# Patient Record
Sex: MALE | Race: WHITE | Employment: OTHER | URBAN - METROPOLITAN AREA
[De-identification: names, ages, dates, MRNs, and addresses within clinical notes are randomized per-mention and may not be internally consistent; named-entity substitution may affect disease eponyms.]

---

## 2017-02-07 PROBLEM — F41.9 ANXIETY: Status: ACTIVE | Noted: 2017-02-07

## 2017-05-12 ENCOUNTER — TELEPHONE (OUTPATIENT)
Dept: FAMILY MEDICINE CLINIC | Facility: CLINIC | Age: 42
End: 2017-05-12

## 2017-05-12 RX ORDER — CLONAZEPAM 0.5 MG/1
0.5 TABLET ORAL 2 TIMES DAILY PRN
Qty: 40 TABLET | Refills: 0 | Status: SHIPPED | OUTPATIENT
Start: 2017-05-12 | End: 2017-08-04

## 2017-05-12 NOTE — TELEPHONE ENCOUNTER
Prescription for ClonazePAM 0.5 M has been called into [facility] in [facility] .

## 2017-09-01 PROCEDURE — 84403 ASSAY OF TOTAL TESTOSTERONE: CPT | Performed by: FAMILY MEDICINE

## 2017-09-01 PROCEDURE — 36415 COLL VENOUS BLD VENIPUNCTURE: CPT | Performed by: FAMILY MEDICINE

## 2017-10-31 ENCOUNTER — TELEPHONE (OUTPATIENT)
Dept: FAMILY MEDICINE CLINIC | Facility: CLINIC | Age: 42
End: 2017-10-31

## 2017-10-31 NOTE — TELEPHONE ENCOUNTER
Needs appt. Spoke to patient and he states he was seeing another provider. Would like to return to Dr. Krishna Cota. Patient asked for refill because \"nothing has changed\". Advised he still needs to schedule appt for further refills from our clinic.   He w

## 2017-10-31 NOTE — TELEPHONE ENCOUNTER
Pt called in requesting refill on medication below. Pt requesting more than one refill if possible. Current Outpatient Prescriptions:     •  ClonazePAM 0.5 MG Oral Tab, Take 1 tablet (0.5 mg total) by mouth 2 (two) times daily as needed for Anxiety. ,

## 2019-02-09 PROCEDURE — 88305 TISSUE EXAM BY PATHOLOGIST: CPT | Performed by: INTERNAL MEDICINE

## 2019-04-10 ENCOUNTER — LAB ENCOUNTER (OUTPATIENT)
Dept: LAB | Age: 44
End: 2019-04-10
Attending: FAMILY MEDICINE
Payer: COMMERCIAL

## 2019-04-10 DIAGNOSIS — R68.82 LIBIDO, DECREASED: ICD-10-CM

## 2019-04-10 DIAGNOSIS — F41.9 ANXIETY: ICD-10-CM

## 2019-04-10 PROCEDURE — 84443 ASSAY THYROID STIM HORMONE: CPT

## 2019-04-10 PROCEDURE — 80053 COMPREHEN METABOLIC PANEL: CPT

## 2019-04-10 PROCEDURE — 84403 ASSAY OF TOTAL TESTOSTERONE: CPT

## 2019-04-10 PROCEDURE — 84402 ASSAY OF FREE TESTOSTERONE: CPT

## 2019-04-10 PROCEDURE — 36415 COLL VENOUS BLD VENIPUNCTURE: CPT

## 2019-04-10 PROCEDURE — 85025 COMPLETE CBC W/AUTO DIFF WBC: CPT

## 2019-04-10 NOTE — PROGRESS NOTES
HPI:    Patient ID: Dickson Sosa is a 45year old male. Patient here due to anxiety problems . Also complains about some dizziness fatigue, has occasional palpitations, low libido.     now, going through court as wife is trying have their child exhibits no tenderness. Abdominal: Soft. Bowel sounds are normal. He exhibits no distension. Periumbilical tenderness   Musculoskeletal: He exhibits no edema, tenderness or deformity.               ASSESSMENT/PLAN:   Anxiety  (primary encounter diagnosi

## 2019-06-26 RX ORDER — PROPRANOLOL HYDROCHLORIDE 10 MG/1
TABLET ORAL
Qty: 90 TABLET | Refills: 1 | Status: SHIPPED | OUTPATIENT
Start: 2019-06-26 | End: 2019-08-16

## 2019-06-26 NOTE — TELEPHONE ENCOUNTER
Refill passed per [facility] protocol.   Hypertensive Medications  Protocol Criteria:  · Appointment scheduled in the past 6 months or in the next 3 months  · BMP or CMP in the past 12 months  · Creatinine result < 2  Recent Outpatient Visits

## 2019-08-16 ENCOUNTER — OFFICE VISIT (OUTPATIENT)
Dept: FAMILY MEDICINE CLINIC | Facility: CLINIC | Age: 44
End: 2019-08-16
Payer: COMMERCIAL

## 2019-08-16 ENCOUNTER — TELEPHONE (OUTPATIENT)
Dept: FAMILY MEDICINE CLINIC | Facility: CLINIC | Age: 44
End: 2019-08-16

## 2019-08-16 ENCOUNTER — LAB ENCOUNTER (OUTPATIENT)
Dept: LAB | Age: 44
End: 2019-08-16
Attending: FAMILY MEDICINE
Payer: COMMERCIAL

## 2019-08-16 DIAGNOSIS — Z00.00 ANNUAL PHYSICAL EXAM: ICD-10-CM

## 2019-08-16 DIAGNOSIS — Z00.00 ANNUAL PHYSICAL EXAM: Primary | ICD-10-CM

## 2019-08-16 LAB
25(OH)D3 SERPL-MCNC: 54.7 NG/ML (ref 30–100)
BASOPHILS # BLD AUTO: 0.04 X10(3) UL (ref 0–0.2)
BASOPHILS NFR BLD AUTO: 1.2 %
CHOLEST SMN-MCNC: 159 MG/DL (ref ?–200)
CRP SERPL HS-MCNC: 0.58 MG/L (ref ?–3)
DEPRECATED RDW RBC AUTO: 41.3 FL (ref 35.1–46.3)
EOSINOPHIL # BLD AUTO: 0.06 X10(3) UL (ref 0–0.7)
EOSINOPHIL NFR BLD AUTO: 1.8 %
ERYTHROCYTE [DISTWIDTH] IN BLOOD BY AUTOMATED COUNT: 12.6 % (ref 11–15)
HCT VFR BLD AUTO: 42.8 % (ref 39–53)
HDLC SERPL-MCNC: 40 MG/DL (ref 40–59)
HGB BLD-MCNC: 14.7 G/DL (ref 13–17.5)
IMM GRANULOCYTES # BLD AUTO: 0.01 X10(3) UL (ref 0–1)
IMM GRANULOCYTES NFR BLD: 0.3 %
IRON SATURATION: 25 % (ref 20–50)
IRON SERPL-MCNC: 93 UG/DL (ref 65–175)
LDLC SERPL CALC-MCNC: 94 MG/DL (ref ?–100)
LYMPHOCYTES # BLD AUTO: 1.07 X10(3) UL (ref 1–4)
LYMPHOCYTES NFR BLD AUTO: 32.4 %
MCH RBC QN AUTO: 30.8 PG (ref 26–34)
MCHC RBC AUTO-ENTMCNC: 34.3 G/DL (ref 31–37)
MCV RBC AUTO: 89.7 FL (ref 80–100)
MONOCYTES # BLD AUTO: 0.29 X10(3) UL (ref 0.1–1)
MONOCYTES NFR BLD AUTO: 8.8 %
NEUTROPHILS # BLD AUTO: 1.83 X10 (3) UL (ref 1.5–7.7)
NEUTROPHILS # BLD AUTO: 1.83 X10(3) UL (ref 1.5–7.7)
NEUTROPHILS NFR BLD AUTO: 55.5 %
NONHDLC SERPL-MCNC: 119 MG/DL (ref ?–130)
PATIENT FASTING: YES
PLATELET # BLD AUTO: 191 10(3)UL (ref 150–450)
RBC # BLD AUTO: 4.77 X10(6)UL (ref 4.3–5.7)
TOTAL IRON BINDING CAPACITY: 370 UG/DL (ref 240–450)
TRANSFERRIN SERPL-MCNC: 248 MG/DL (ref 200–360)
TRIGL SERPL-MCNC: 124 MG/DL (ref 30–149)
VIT B12 SERPL-MCNC: 801 PG/ML (ref 193–986)
VLDLC SERPL CALC-MCNC: 25 MG/DL (ref 0–30)
WBC # BLD AUTO: 3.3 X10(3) UL (ref 4–11)

## 2019-08-16 PROCEDURE — 80061 LIPID PANEL: CPT

## 2019-08-16 PROCEDURE — 86141 C-REACTIVE PROTEIN HS: CPT

## 2019-08-16 PROCEDURE — 85025 COMPLETE CBC W/AUTO DIFF WBC: CPT

## 2019-08-16 PROCEDURE — 82306 VITAMIN D 25 HYDROXY: CPT

## 2019-08-16 PROCEDURE — 84466 ASSAY OF TRANSFERRIN: CPT

## 2019-08-16 PROCEDURE — 82607 VITAMIN B-12: CPT

## 2019-08-16 PROCEDURE — 36415 COLL VENOUS BLD VENIPUNCTURE: CPT

## 2019-08-16 PROCEDURE — 99396 PREV VISIT EST AGE 40-64: CPT | Performed by: FAMILY MEDICINE

## 2019-08-16 PROCEDURE — 83540 ASSAY OF IRON: CPT

## 2019-08-16 RX ORDER — ESCITALOPRAM OXALATE 10 MG/1
TABLET ORAL
Qty: 90 TABLET | Refills: 0 | Status: SHIPPED | OUTPATIENT
Start: 2019-08-16 | End: 2019-10-21

## 2019-08-16 RX ORDER — METHOCARBAMOL 500 MG/1
500 TABLET, FILM COATED ORAL 4 TIMES DAILY
Qty: 40 TABLET | Refills: 0 | Status: SHIPPED | OUTPATIENT
Start: 2019-08-16 | End: 2020-01-14

## 2019-08-16 RX ORDER — BUPROPION HYDROCHLORIDE 150 MG/1
TABLET ORAL
Qty: 30 TABLET | Refills: 0 | Status: SHIPPED | OUTPATIENT
Start: 2019-08-16 | End: 2019-08-16

## 2019-08-16 RX ORDER — ESZOPICLONE 2 MG/1
2 TABLET, FILM COATED ORAL NIGHTLY
Qty: 90 TABLET | Refills: 3 | Status: SHIPPED | OUTPATIENT
Start: 2019-08-16 | End: 2019-10-18

## 2019-08-16 RX ORDER — CLONAZEPAM 0.5 MG/1
0.5 TABLET ORAL 2 TIMES DAILY PRN
Qty: 90 TABLET | Refills: 0 | Status: SHIPPED | OUTPATIENT
Start: 2019-08-16 | End: 2020-01-14

## 2019-08-16 NOTE — TELEPHONE ENCOUNTER
Pt called in he stated out of the 4 medications he was given only 2 was sent to Pharmacy  The medications listed below he stated was not at the pharmacy      Current Outpatient Medications:  clonazePAM 0.5 MG Oral Tab Take 1 tablet (0.5 mg total) by mouth

## 2019-08-16 NOTE — PROGRESS NOTES
HPI:    Patient ID: Alma Kasper is a 45year old male. Patient is still quite anxious and feels very tired      Review of Systems   Constitutional: Positive for fatigue. Negative for activity change, appetite change and unexpected weight change.    Respi ambien  F/u in 6 weeks  Orders Placed This Encounter      Vitamin D, 25-Hydroxy [E]      Vitamin B12 [E]      Iron And Tibc [E]      Lipid Panel [E]      CBC W Differential W Platelet [E]      C-RP/High Sensitivity [E]      Meds This Visit:  Requested Pres

## 2019-08-28 ENCOUNTER — TELEPHONE (OUTPATIENT)
Dept: FAMILY MEDICINE CLINIC | Facility: CLINIC | Age: 44
End: 2019-08-28

## 2019-08-28 RX ORDER — ESZOPICLONE 1 MG/1
1 TABLET, FILM COATED ORAL NIGHTLY
Qty: 90 TABLET | Refills: 1 | Status: SHIPPED | OUTPATIENT
Start: 2019-08-28 | End: 2020-01-14

## 2019-08-28 NOTE — TELEPHONE ENCOUNTER
Please clarify Rx prior to calling pharmacy--your message states #30 of 1 mg, but order entered is for 1 mg  #90 + 1    Please reply to pool: EM RN Beau Ricardo, not in routing comments please  Medication Question     Jose Juan Montejo MD  Em Rn Triage 50 minutes a

## 2019-08-28 NOTE — TELEPHONE ENCOUNTER
Per pt, he is taking Eszopiclone (LUNESTA) 2mg now BUT would like to know if TSB can reduce it to 1mg. Pls advise and send new rx med to his pharmacy on file verified.

## 2019-09-05 ENCOUNTER — LAB ENCOUNTER (OUTPATIENT)
Dept: LAB | Facility: HOSPITAL | Age: 44
End: 2019-09-05
Attending: INTERNAL MEDICINE
Payer: COMMERCIAL

## 2019-09-05 ENCOUNTER — OFFICE VISIT (OUTPATIENT)
Dept: HEMATOLOGY/ONCOLOGY | Facility: HOSPITAL | Age: 44
End: 2019-09-05
Attending: INTERNAL MEDICINE
Payer: COMMERCIAL

## 2019-09-05 VITALS
HEART RATE: 85 BPM | HEIGHT: 70 IN | DIASTOLIC BLOOD PRESSURE: 67 MMHG | WEIGHT: 173 LBS | RESPIRATION RATE: 16 BRPM | BODY MASS INDEX: 24.77 KG/M2 | SYSTOLIC BLOOD PRESSURE: 123 MMHG | TEMPERATURE: 98 F

## 2019-09-05 DIAGNOSIS — D70.8 OTHER NEUTROPENIA (HCC): ICD-10-CM

## 2019-09-05 DIAGNOSIS — D70.8 OTHER NEUTROPENIA (HCC): Primary | ICD-10-CM

## 2019-09-05 DIAGNOSIS — F41.9 ANXIETY: ICD-10-CM

## 2019-09-05 LAB
ALBUMIN SERPL-MCNC: 4 G/DL (ref 3.4–5)
ALBUMIN/GLOB SERPL: 1.1 {RATIO} (ref 1–2)
ALP LIVER SERPL-CCNC: 75 U/L (ref 45–117)
ALT SERPL-CCNC: 30 U/L (ref 16–61)
ANION GAP SERPL CALC-SCNC: 6 MMOL/L (ref 0–18)
AST SERPL-CCNC: 14 U/L (ref 15–37)
BASOPHILS # BLD AUTO: 0.04 X10(3) UL (ref 0–0.2)
BASOPHILS NFR BLD AUTO: 0.9 %
BILIRUB SERPL-MCNC: 0.7 MG/DL (ref 0.1–2)
BUN BLD-MCNC: 11 MG/DL (ref 7–18)
BUN/CREAT SERPL: 12.1 (ref 10–20)
C3 SERPL-MCNC: 125 MG/DL (ref 90–180)
C4 SERPL-MCNC: 25.5 MG/DL (ref 10–40)
CALCIUM BLD-MCNC: 9.3 MG/DL (ref 8.5–10.1)
CHLORIDE SERPL-SCNC: 107 MMOL/L (ref 98–112)
CO2 SERPL-SCNC: 29 MMOL/L (ref 21–32)
CREAT BLD-MCNC: 0.91 MG/DL (ref 0.7–1.3)
DEPRECATED HBV CORE AB SER IA-ACNC: 34.1 NG/ML (ref 30–490)
DEPRECATED RDW RBC AUTO: 43.4 FL (ref 35.1–46.3)
EOSINOPHIL # BLD AUTO: 0.11 X10(3) UL (ref 0–0.7)
EOSINOPHIL NFR BLD AUTO: 2.6 %
ERYTHROCYTE [DISTWIDTH] IN BLOOD BY AUTOMATED COUNT: 13 % (ref 11–15)
ERYTHROCYTE [SEDIMENTATION RATE] IN BLOOD: 8 MM/HR (ref 0–15)
FOLATE SERPL-MCNC: >20 NG/ML (ref 8.7–?)
GLOBULIN PLAS-MCNC: 3.6 G/DL (ref 2.8–4.4)
GLUCOSE BLD-MCNC: 98 MG/DL (ref 70–99)
HCT VFR BLD AUTO: 42.3 % (ref 39–53)
HGB BLD-MCNC: 14.7 G/DL (ref 13–17.5)
HGB RETIC QN AUTO: 35.5 PG (ref 28.2–36.6)
IGA SERPL-MCNC: 277 MG/DL (ref 70–312)
IGM SERPL-MCNC: 108 MG/DL (ref 43–279)
IMM GRANULOCYTES # BLD AUTO: 0.01 X10(3) UL (ref 0–1)
IMM GRANULOCYTES NFR BLD: 0.2 %
IMM RETICS NFR: 0.08 RATIO (ref 0.1–0.3)
IMMUNOGLOBULIN PNL SER-MCNC: 1020 MG/DL (ref 791–1643)
LDH SERPL L TO P-CCNC: 148 U/L (ref 87–241)
LYMPHOCYTES # BLD AUTO: 1.01 X10(3) UL (ref 1–4)
LYMPHOCYTES NFR BLD AUTO: 23.8 %
M PROTEIN MFR SERPL ELPH: 7.6 G/DL (ref 6.4–8.2)
MCH RBC QN AUTO: 31.7 PG (ref 26–34)
MCHC RBC AUTO-ENTMCNC: 34.8 G/DL (ref 31–37)
MCV RBC AUTO: 91.2 FL (ref 80–100)
MONOCYTES # BLD AUTO: 0.35 X10(3) UL (ref 0.1–1)
MONOCYTES NFR BLD AUTO: 8.2 %
NEUTROPHILS # BLD AUTO: 2.73 X10 (3) UL (ref 1.5–7.7)
NEUTROPHILS # BLD AUTO: 2.73 X10(3) UL (ref 1.5–7.7)
NEUTROPHILS NFR BLD AUTO: 64.3 %
OSMOLALITY SERPL CALC.SUM OF ELEC: 293 MOSM/KG (ref 275–295)
PATIENT FASTING: NO
PLATELET # BLD AUTO: 172 10(3)UL (ref 150–450)
POTASSIUM SERPL-SCNC: 4.4 MMOL/L (ref 3.5–5.1)
RBC # BLD AUTO: 4.64 X10(6)UL (ref 4.3–5.7)
RETICS # AUTO: 73.3 X10(3) UL (ref 22.5–147.5)
RETICS/RBC NFR AUTO: 1.6 % (ref 0.5–2.5)
SODIUM SERPL-SCNC: 142 MMOL/L (ref 136–145)
TSI SER-ACNC: 0.86 MIU/ML (ref 0.36–3.74)
VIT B12 SERPL-MCNC: 682 PG/ML (ref 193–986)
WBC # BLD AUTO: 4.3 X10(3) UL (ref 4–11)

## 2019-09-05 PROCEDURE — 86160 COMPLEMENT ANTIGEN: CPT

## 2019-09-05 PROCEDURE — 87340 HEPATITIS B SURFACE AG IA: CPT

## 2019-09-05 PROCEDURE — 85652 RBC SED RATE AUTOMATED: CPT

## 2019-09-05 PROCEDURE — 80053 COMPREHEN METABOLIC PANEL: CPT

## 2019-09-05 PROCEDURE — 83615 LACTATE (LD) (LDH) ENZYME: CPT

## 2019-09-05 PROCEDURE — 80500 HEPATITIS A B + C PROFILE: CPT

## 2019-09-05 PROCEDURE — 86706 HEP B SURFACE ANTIBODY: CPT

## 2019-09-05 PROCEDURE — 84443 ASSAY THYROID STIM HORMONE: CPT

## 2019-09-05 PROCEDURE — 87389 HIV-1 AG W/HIV-1&-2 AB AG IA: CPT

## 2019-09-05 PROCEDURE — 85025 COMPLETE CBC W/AUTO DIFF WBC: CPT

## 2019-09-05 PROCEDURE — 85045 AUTOMATED RETICULOCYTE COUNT: CPT

## 2019-09-05 PROCEDURE — 86708 HEPATITIS A ANTIBODY: CPT

## 2019-09-05 PROCEDURE — 36415 COLL VENOUS BLD VENIPUNCTURE: CPT

## 2019-09-05 PROCEDURE — 86704 HEP B CORE ANTIBODY TOTAL: CPT

## 2019-09-05 PROCEDURE — 82746 ASSAY OF FOLIC ACID SERUM: CPT

## 2019-09-05 PROCEDURE — 86803 HEPATITIS C AB TEST: CPT

## 2019-09-05 PROCEDURE — 82607 VITAMIN B-12: CPT

## 2019-09-05 PROCEDURE — 82728 ASSAY OF FERRITIN: CPT

## 2019-09-05 PROCEDURE — 99244 OFF/OP CNSLTJ NEW/EST MOD 40: CPT | Performed by: INTERNAL MEDICINE

## 2019-09-05 PROCEDURE — 86038 ANTINUCLEAR ANTIBODIES: CPT

## 2019-09-05 PROCEDURE — 82784 ASSAY IGA/IGD/IGG/IGM EACH: CPT

## 2019-09-05 NOTE — CONSULTS
[facility] History and Physical    Patient Name: Nila Damian   YOB: 1975   Medical Record Number: [id number]   CSN: [id number]   Attending Physician:  Hailee Garvey MD     Date of Visit: 9/5/2019     Chief Complaint:  Patient presents with: Smoking: none  ETOH: drinking pattern with father's death; none; last ETOH was 2 years, did have relapse  Work: computer programming;     Current Medications:    Current Outpatient Medications:   •  Eszopiclone (LUNESTA) 1 MG Oral Tab, Take 1 tablet (1 m Lab Results   Component Value Date    WBC 4.3 09/05/2019    RBC 4.64 09/05/2019    HGB 14.7 09/05/2019    HCT 42.3 09/05/2019    MCV 91.2 09/05/2019    MCH 31.7 09/05/2019    MCHC 34.8 09/05/2019    RDW 13.0 09/05/2019    .0 09/05/2019    MPV 7.6

## 2019-09-06 PROBLEM — D70.8 OTHER NEUTROPENIA (HCC): Status: ACTIVE | Noted: 2019-09-06

## 2019-09-06 LAB
HAV AB SER QL IA: NONREACTIVE
HBV CORE AB SERPL QL IA: NONREACTIVE
HBV SURFACE AB SER QL: NONREACTIVE
HBV SURFACE AB SERPL IA-ACNC: 3.61 MIU/ML
HBV SURFACE AG SERPL QL IA: NONREACTIVE
HCV AB SERPL QL IA: NONREACTIVE

## 2019-09-09 LAB — NUCLEAR IGG TITR SER IF: NEGATIVE {TITER}

## 2019-09-19 ENCOUNTER — OFFICE VISIT (OUTPATIENT)
Dept: HEMATOLOGY/ONCOLOGY | Facility: HOSPITAL | Age: 44
End: 2019-09-19
Attending: INTERNAL MEDICINE
Payer: COMMERCIAL

## 2019-09-19 VITALS
OXYGEN SATURATION: 99 % | RESPIRATION RATE: 16 BRPM | BODY MASS INDEX: 24.91 KG/M2 | SYSTOLIC BLOOD PRESSURE: 117 MMHG | DIASTOLIC BLOOD PRESSURE: 70 MMHG | HEIGHT: 70 IN | TEMPERATURE: 98 F | WEIGHT: 174 LBS | HEART RATE: 74 BPM

## 2019-09-19 DIAGNOSIS — F41.9 ANXIETY: ICD-10-CM

## 2019-09-19 DIAGNOSIS — D70.8 OTHER NEUTROPENIA (HCC): Primary | ICD-10-CM

## 2019-09-19 PROCEDURE — 99213 OFFICE O/P EST LOW 20 MIN: CPT | Performed by: INTERNAL MEDICINE

## 2019-09-19 NOTE — PROGRESS NOTES
Cancer Center Progress Note    Patient Name: Felisha Castrejon   YOB: 1975   Medical Record Number: [id number]   Attending Physician: Cornell Guillory M.D.      Chief Complaint:  nuetropenia    Oncology History:  Felisha Castrejon is a 45year old male with p (0.5 mg total) by mouth 2 (two) times daily as needed for Anxiety. , Disp: 90 tablet, Rfl: 0  •  Eszopiclone (LUNESTA) 2 MG Oral Tab, Take 1 tablet (2 mg total) by mouth nightly., Disp: 90 tablet, Rfl: 3  •  escitalopram (LEXAPRO) 10 MG Oral Tab, 1/2 po qd

## 2019-09-23 PROCEDURE — 88305 TISSUE EXAM BY PATHOLOGIST: CPT | Performed by: INTERNAL MEDICINE

## 2019-10-18 NOTE — PROGRESS NOTES
HPI:    Patient ID: Jose J Montague is a 45year old male. Here for f/u anxiety. .. patient doing better. Needs refill on his medication . Also cbc normal.                       Review of Systems   Constitutional: Positive for fatigue.  Negative for activit Visit:  Requested Prescriptions     Signed Prescriptions Disp Refills   • diazepam 2 MG Oral Tab 1 tablet 1     Sig: diazepam 2 mg tablet       Imaging & Referrals:  None       DD#[id number]

## 2019-10-23 RX ORDER — ESCITALOPRAM OXALATE 10 MG/1
TABLET ORAL
Qty: 90 TABLET | Refills: 1 | Status: SHIPPED | OUTPATIENT
Start: 2019-10-23 | End: 2021-10-01

## 2019-10-24 RX ORDER — DIAZEPAM 2 MG/1
TABLET ORAL
Qty: 40 TABLET | Refills: 0 | OUTPATIENT
Start: 2019-10-24 | End: 2020-01-14

## 2019-10-24 NOTE — TELEPHONE ENCOUNTER
Controlled medication pending for review. If approved needs to be called in or faxed by on-site staff. Last Rx: 10/18/19 #1#1   LOV: 10/18/19    Refill passed per [facility] protocol.   Refill Protocol Appointment Criteria  · Appointment scheduled

## 2019-10-25 RX ORDER — DIAZEPAM 2 MG/1
TABLET ORAL
Qty: 40 TABLET | Refills: 0 | OUTPATIENT
Start: 2019-10-25

## 2019-12-16 ENCOUNTER — OFFICE VISIT (OUTPATIENT)
Dept: HEMATOLOGY/ONCOLOGY | Facility: HOSPITAL | Age: 44
End: 2019-12-16
Attending: INTERNAL MEDICINE
Payer: COMMERCIAL

## 2019-12-16 ENCOUNTER — LAB ENCOUNTER (OUTPATIENT)
Dept: LAB | Facility: HOSPITAL | Age: 44
End: 2019-12-16
Attending: INTERNAL MEDICINE
Payer: COMMERCIAL

## 2019-12-16 ENCOUNTER — TELEPHONE (OUTPATIENT)
Dept: HEMATOLOGY/ONCOLOGY | Facility: HOSPITAL | Age: 44
End: 2019-12-16

## 2019-12-16 VITALS
WEIGHT: 177 LBS | SYSTOLIC BLOOD PRESSURE: 119 MMHG | TEMPERATURE: 98 F | RESPIRATION RATE: 16 BRPM | DIASTOLIC BLOOD PRESSURE: 69 MMHG | OXYGEN SATURATION: 100 % | HEIGHT: 70 IN | HEART RATE: 67 BPM | BODY MASS INDEX: 25.34 KG/M2

## 2019-12-16 DIAGNOSIS — D70.8 OTHER NEUTROPENIA (HCC): ICD-10-CM

## 2019-12-16 DIAGNOSIS — F41.9 ANXIETY: ICD-10-CM

## 2019-12-16 DIAGNOSIS — D70.8 OTHER NEUTROPENIA (HCC): Primary | ICD-10-CM

## 2019-12-16 PROCEDURE — 99213 OFFICE O/P EST LOW 20 MIN: CPT | Performed by: INTERNAL MEDICINE

## 2019-12-16 PROCEDURE — 36415 COLL VENOUS BLD VENIPUNCTURE: CPT

## 2019-12-16 PROCEDURE — 80053 COMPREHEN METABOLIC PANEL: CPT

## 2019-12-16 PROCEDURE — 85025 COMPLETE CBC W/AUTO DIFF WBC: CPT

## 2019-12-16 NOTE — PROGRESS NOTES
Cancer Center Progress Note    Patient Name: Skye Chappell   YOB: 1975   Medical Record Number: [id number]   Attending Physician: Dale Gee M.D.      Chief Complaint:  nuetropenia    Oncology History:  See consult note    Interim HPI:  Feels No edema, cyanosis, or bruising  Neurological: motor strength grossly intact  Psych: appropriate mood and affect    Skin: no lesion    Laboratory assessed and reviewed:  Lab Results   Component Value Date    GLU 98 09/05/2019    BUN 11 09/05/2019    BUNCRE

## 2019-12-16 NOTE — TELEPHONE ENCOUNTER
Called [facility] to discuss lab results per Dr Zhane Jasso. Explained lab results and asked pt to please follow with his PCP in 8-12 weeks with repeat labs.   It was at this time that the patient stated that he saw Dr Tonia Cogan note on mychart and stated that it was [facility]

## 2020-01-15 RX ORDER — ESCITALOPRAM OXALATE 10 MG/1
TABLET ORAL
Qty: 90 TABLET | Refills: 1 | OUTPATIENT
Start: 2020-01-15

## 2020-01-16 NOTE — TELEPHONE ENCOUNTER
Review pended refill request as it does not fall under a protocol. Last Rx: 8/2019  LOV: 2 months ago    Duplicate request, previously addressed.

## 2020-01-17 RX ORDER — METHOCARBAMOL 500 MG/1
500 TABLET, FILM COATED ORAL 4 TIMES DAILY
Qty: 40 TABLET | Refills: 0 | Status: SHIPPED | OUTPATIENT
Start: 2020-01-17

## 2020-01-17 RX ORDER — ESZOPICLONE 1 MG/1
1 TABLET, FILM COATED ORAL NIGHTLY
Qty: 90 TABLET | Refills: 1 | Status: SHIPPED | OUTPATIENT
Start: 2020-01-17

## 2020-01-17 RX ORDER — CLONAZEPAM 0.5 MG/1
0.5 TABLET ORAL 2 TIMES DAILY PRN
Qty: 90 TABLET | Refills: 0 | Status: SHIPPED | OUTPATIENT
Start: 2020-01-17 | End: 2021-10-01

## 2020-01-17 RX ORDER — DIAZEPAM 2 MG/1
TABLET ORAL
Qty: 40 TABLET | Refills: 0 | Status: SHIPPED | OUTPATIENT
Start: 2020-01-17 | End: 2021-10-01

## 2020-04-17 RX ORDER — ESCITALOPRAM OXALATE 10 MG/1
TABLET ORAL
Qty: 90 TABLET | Refills: 1 | OUTPATIENT
Start: 2020-04-17

## 2020-09-25 ENCOUNTER — HOSPITAL (OUTPATIENT)
Dept: OTHER | Age: 45
End: 2020-09-25
Attending: PSYCHIATRY & NEUROLOGY

## 2020-10-12 ENCOUNTER — HOSPITAL (OUTPATIENT)
Dept: OTHER | Age: 45
End: 2020-10-12
Attending: PAIN MEDICINE

## 2021-08-31 ENCOUNTER — WALK IN (OUTPATIENT)
Dept: URGENT CARE | Age: 46
End: 2021-08-31

## 2021-08-31 VITALS — HEART RATE: 90 BPM | RESPIRATION RATE: 18 BRPM | TEMPERATURE: 98.5 F | OXYGEN SATURATION: 98 %

## 2021-08-31 DIAGNOSIS — R09.81 SINUS CONGESTION: Primary | ICD-10-CM

## 2021-08-31 LAB — SARS-COV+SARS-COV-2 AG RESP QL IA.RAPID: NOT DETECTED

## 2021-08-31 PROCEDURE — 99203 OFFICE O/P NEW LOW 30 MIN: CPT | Performed by: FAMILY MEDICINE

## 2021-08-31 PROCEDURE — 87426 SARSCOV CORONAVIRUS AG IA: CPT | Performed by: FAMILY MEDICINE

## 2021-08-31 RX ORDER — CYCLOBENZAPRINE HCL 10 MG
10 TABLET ORAL
COMMUNITY
Start: 2021-07-02

## 2021-08-31 RX ORDER — ESZOPICLONE 1 MG/1
1 TABLET, FILM COATED ORAL
COMMUNITY
Start: 2020-01-17

## 2021-08-31 RX ORDER — DIAZEPAM 2 MG/1
1 TABLET ORAL EVERY 8 HOURS PRN
COMMUNITY
Start: 2020-01-17

## 2021-08-31 RX ORDER — ESCITALOPRAM OXALATE 10 MG/1
1 TABLET ORAL DAILY
COMMUNITY
Start: 2019-10-23

## 2021-08-31 RX ORDER — TRAMADOL HYDROCHLORIDE 50 MG/1
TABLET ORAL
COMMUNITY
Start: 2021-07-02

## 2021-08-31 ASSESSMENT — PAIN SCALES - GENERAL: PAINLEVEL: 0

## 2021-10-01 ENCOUNTER — LAB ENCOUNTER (OUTPATIENT)
Dept: LAB | Age: 46
End: 2021-10-01
Attending: FAMILY MEDICINE
Payer: COMMERCIAL

## 2021-10-01 ENCOUNTER — OFFICE VISIT (OUTPATIENT)
Dept: FAMILY MEDICINE CLINIC | Facility: CLINIC | Age: 46
End: 2021-10-01
Payer: COMMERCIAL

## 2021-10-01 VITALS
HEIGHT: 70 IN | BODY MASS INDEX: 25.77 KG/M2 | DIASTOLIC BLOOD PRESSURE: 66 MMHG | WEIGHT: 180 LBS | HEART RATE: 76 BPM | SYSTOLIC BLOOD PRESSURE: 100 MMHG

## 2021-10-01 DIAGNOSIS — R10.9 ABDOMINAL PAIN, UNSPECIFIED ABDOMINAL LOCATION: ICD-10-CM

## 2021-10-01 DIAGNOSIS — Z00.00 ANNUAL PHYSICAL EXAM: Primary | ICD-10-CM

## 2021-10-01 DIAGNOSIS — Z00.00 ANNUAL PHYSICAL EXAM: ICD-10-CM

## 2021-10-01 PROCEDURE — 86038 ANTINUCLEAR ANTIBODIES: CPT

## 2021-10-01 PROCEDURE — 80053 COMPREHEN METABOLIC PANEL: CPT

## 2021-10-01 PROCEDURE — 3078F DIAST BP <80 MM HG: CPT | Performed by: FAMILY MEDICINE

## 2021-10-01 PROCEDURE — 3008F BODY MASS INDEX DOCD: CPT | Performed by: FAMILY MEDICINE

## 2021-10-01 PROCEDURE — 85025 COMPLETE CBC W/AUTO DIFF WBC: CPT

## 2021-10-01 PROCEDURE — 3074F SYST BP LT 130 MM HG: CPT | Performed by: FAMILY MEDICINE

## 2021-10-01 PROCEDURE — 36415 COLL VENOUS BLD VENIPUNCTURE: CPT

## 2021-10-01 PROCEDURE — 80061 LIPID PANEL: CPT

## 2021-10-01 PROCEDURE — 99396 PREV VISIT EST AGE 40-64: CPT | Performed by: FAMILY MEDICINE

## 2021-10-01 PROCEDURE — 86140 C-REACTIVE PROTEIN: CPT

## 2021-10-01 PROCEDURE — 82306 VITAMIN D 25 HYDROXY: CPT

## 2021-10-01 RX ORDER — ESZOPICLONE 1 MG/1
1 TABLET, FILM COATED ORAL NIGHTLY
Qty: 30 TABLET | Refills: 0 | Status: SHIPPED | OUTPATIENT
Start: 2021-10-01

## 2021-10-01 RX ORDER — METHOCARBAMOL 500 MG/1
500 TABLET, FILM COATED ORAL 4 TIMES DAILY
Qty: 70 TABLET | Refills: 0 | Status: SHIPPED | OUTPATIENT
Start: 2021-10-01

## 2021-10-01 RX ORDER — CYCLOBENZAPRINE HCL 10 MG
10 TABLET ORAL NIGHTLY PRN
COMMUNITY
Start: 2021-07-02 | End: 2021-10-01

## 2021-10-01 NOTE — PROGRESS NOTES
Subjective:   Patient ID: Damion Schreiber is a 42year old male. Here for annual physical   Doing well. Has occasional muscle stiffness and asking for refill of robaxine . Also sometimes trouble with sleep.        History/Other:   Review of Systems   Const Referrals:  None

## 2022-06-06 RX ORDER — ESZOPICLONE 1 MG/1
1 TABLET, FILM COATED ORAL NIGHTLY
Qty: 30 TABLET | Refills: 0 | Status: SHIPPED | OUTPATIENT
Start: 2022-06-06 | End: 2022-10-18

## 2022-06-06 RX ORDER — METHOCARBAMOL 500 MG/1
500 TABLET, FILM COATED ORAL 4 TIMES DAILY
Qty: 30 TABLET | Refills: 0 | Status: SHIPPED | OUTPATIENT
Start: 2022-06-06

## 2022-06-06 NOTE — TELEPHONE ENCOUNTER
Please review Protocol Failed/No Protocol        Requested Prescriptions   Pending Prescriptions Disp Refills    eszopiclone (LUNESTA) 1 MG Oral Tab 90 tablet 1     Sig: Take 1 tablet (1 mg total) by mouth nightly. There is no refill protocol information for this order        methocarbamol (ROBAXIN) 500 MG Oral Tab 70 tablet 0     Sig: Take 1 tablet (500 mg total) by mouth 4 (four) times daily.         There is no refill protocol information for this order               Recent Outpatient Visits              8 months ago Annual physical exam    Pippa Chong MD    Office Visit    2 years ago Other neutropenia [facility])    [facility] Hematology Oncology Arin Guillory MD    Office Visit    2 years ago Darrius Villanueva MD    Office Visit    2 years ago Other neutropenia [facility])    [facility] Hematology Oncology Arin Guillory MD    Office Visit    2 years ago Other neutropenia [facility])    [facility]ematology Oncology Arin Guillory MD    Office Visit

## 2022-06-07 NOTE — TELEPHONE ENCOUNTER
Call center please call and schedule an appointment. Thank You. [employer] message sent to the patient.         Authorized by: Jayjay Sosa MD     Non-formulary    To pharmacy: Needs to make an apt for further refills

## 2022-10-09 ENCOUNTER — IMAGING SERVICES (OUTPATIENT)
Dept: GENERAL RADIOLOGY | Age: 47
End: 2022-10-09
Attending: FAMILY MEDICINE

## 2022-10-09 ENCOUNTER — WALK IN (OUTPATIENT)
Dept: URGENT CARE | Age: 47
End: 2022-10-09

## 2022-10-09 VITALS
HEART RATE: 79 BPM | DIASTOLIC BLOOD PRESSURE: 82 MMHG | SYSTOLIC BLOOD PRESSURE: 120 MMHG | TEMPERATURE: 97.9 F | RESPIRATION RATE: 18 BRPM

## 2022-10-09 DIAGNOSIS — R10.31 RIGHT LOWER QUADRANT ABDOMINAL PAIN: Primary | ICD-10-CM

## 2022-10-09 DIAGNOSIS — R10.31 RIGHT LOWER QUADRANT ABDOMINAL PAIN: ICD-10-CM

## 2022-10-09 LAB
APPEARANCE, POC: CLEAR
BILIRUBIN, POC: NEGATIVE
COLOR, POC: YELLOW
GLUCOSE UR-MCNC: NEGATIVE MG/DL
KETONES, POC: NEGATIVE MG/DL
NITRITE, POC: NEGATIVE
OCCULT BLOOD, POC: ABNORMAL
PH UR: 5.5 [PH] (ref 5–7)
PROT UR-MCNC: NEGATIVE MG/DL
SP GR UR: <= 1.005 (ref 1–1.03)
UROBILINOGEN UR-MCNC: 0.2 MG/DL (ref 0–1)
WBC (LEUKOCYTE) ESTERASE, POC: NEGATIVE

## 2022-10-09 PROCEDURE — 74018 RADEX ABDOMEN 1 VIEW: CPT | Performed by: RADIOLOGY

## 2022-10-09 PROCEDURE — PSEU9005 URINE, BACTERIAL CULTURE: Performed by: CLINICAL MEDICAL LABORATORY

## 2022-10-09 PROCEDURE — 87086 URINE CULTURE/COLONY COUNT: CPT | Performed by: FAMILY MEDICINE

## 2022-10-09 PROCEDURE — 99214 OFFICE O/P EST MOD 30 MIN: CPT | Performed by: FAMILY MEDICINE

## 2022-10-09 PROCEDURE — 81015 MICROSCOPIC EXAM OF URINE: CPT | Performed by: FAMILY MEDICINE

## 2022-10-09 PROCEDURE — 81002 URINALYSIS NONAUTO W/O SCOPE: CPT | Performed by: FAMILY MEDICINE

## 2022-10-09 PROCEDURE — 87086 URINE CULTURE/COLONY COUNT: CPT | Performed by: CLINICAL MEDICAL LABORATORY

## 2022-10-09 ASSESSMENT — PAIN SCALES - GENERAL: PAINLEVEL: 0

## 2022-10-10 ENCOUNTER — LAB REQUISITION (OUTPATIENT)
Dept: LAB | Age: 47
End: 2022-10-10

## 2022-10-10 DIAGNOSIS — R10.31 RIGHT LOWER QUADRANT PAIN: ICD-10-CM

## 2022-10-10 LAB
MUCOUS: NORMAL
RED BLOOD CELLS URINE: 0 (ref 0–3)
SQUAMOUS EPITHELIAL CELLS: 0
WHITE BLOOD CELLS URINE: 0 (ref 0–5)

## 2022-10-11 ENCOUNTER — HOSPITAL ENCOUNTER (OUTPATIENT)
Dept: CT IMAGING | Age: 47
Discharge: HOME | End: 2022-10-11
Attending: FAMILY MEDICINE

## 2022-10-11 DIAGNOSIS — Z13.6 SCREENING FOR CARDIOVASCULAR CONDITION: ICD-10-CM

## 2022-10-11 NOTE — PROGRESS NOTES
Date of Service 10/11/2022    Ev Watson  Date of Birth 1/17/1975    Patient Age: 41year old    PCP: Caleen Frankel, MD  [address] [place] [id number]    Consult Type  Type Scan/Screening: Heart Scan  Preliminary Heart Scan Score: 0                Body Mass Index  There is no height or weight on file to calculate BMI. Lipid Profile  Cholesterol: 242, done on 10/1/2021. HDL Cholesterol: 47, done on 10/1/2021. LDL Cholesterol: 180, done on 10/1/2021. TriGlycerides 88, done on 10/1/2021. Lower LDL - goal is less than 100. Nurse Review  Risk factor information and results reviewed with Nurse: Yes    Recommended Follow Up:  Consult your physician regarding[de-identified] Final Heart Scan Report; Discuss potential for Incidental Finding    No data recorded      Recommendations for Change:     Cholesterol Modification (goal of therapy depends upon your risk): Decrease LDL (Lousy/Bad) Ideal <100     Smoking Cessation: No Change Needed  Weight Management: Maintain Current Weight  Stress Management: Adopt Stress Management Techniques  Repeat Heart Scan: 5 years if Calcium Score is 0. 0; Discuss with your Physician          Noni Recommended Resources: Ayesha Nichole RN        Please Contact the Nurse Heart Line with any Questions or Concerns [phone].

## 2022-10-12 LAB
BACTERIA UR CULT: NORMAL
BACTERIA UR CULT: NORMAL

## 2022-10-14 ENCOUNTER — OFFICE VISIT (OUTPATIENT)
Dept: FAMILY MEDICINE CLINIC | Facility: CLINIC | Age: 47
End: 2022-10-14
Payer: COMMERCIAL

## 2022-10-14 ENCOUNTER — LAB ENCOUNTER (OUTPATIENT)
Dept: LAB | Age: 47
End: 2022-10-14
Attending: FAMILY MEDICINE
Payer: COMMERCIAL

## 2022-10-14 VITALS
SYSTOLIC BLOOD PRESSURE: 121 MMHG | BODY MASS INDEX: 25.77 KG/M2 | DIASTOLIC BLOOD PRESSURE: 73 MMHG | HEART RATE: 71 BPM | WEIGHT: 180 LBS | HEIGHT: 70 IN

## 2022-10-14 DIAGNOSIS — R10.9 ABDOMINAL PAIN, UNSPECIFIED ABDOMINAL LOCATION: Primary | ICD-10-CM

## 2022-10-14 DIAGNOSIS — Z00.00 ANNUAL PHYSICAL EXAM: ICD-10-CM

## 2022-10-14 DIAGNOSIS — Z80.0 FAMILY HISTORY OF COLON CANCER: ICD-10-CM

## 2022-10-14 LAB
ALBUMIN SERPL-MCNC: 4.2 G/DL (ref 3.4–5)
ALBUMIN/GLOB SERPL: 1.1 {RATIO} (ref 1–2)
ALP LIVER SERPL-CCNC: 64 U/L
ALT SERPL-CCNC: 23 U/L
ANION GAP SERPL CALC-SCNC: 8 MMOL/L (ref 0–18)
AST SERPL-CCNC: 17 U/L (ref 15–37)
BILIRUB SERPL-MCNC: 0.8 MG/DL (ref 0.1–2)
BUN BLD-MCNC: 15 MG/DL (ref 7–18)
BUN/CREAT SERPL: 11.3 (ref 10–20)
CALCIUM BLD-MCNC: 9.4 MG/DL (ref 8.5–10.1)
CHLORIDE SERPL-SCNC: 104 MMOL/L (ref 98–112)
CHOLEST SERPL-MCNC: 233 MG/DL (ref ?–200)
CO2 SERPL-SCNC: 25 MMOL/L (ref 21–32)
COMPLEXED PSA SERPL-MCNC: 0.78 NG/ML (ref ?–4)
CREAT BLD-MCNC: 1.33 MG/DL
FASTING PATIENT LIPID ANSWER: YES
FASTING STATUS PATIENT QL REPORTED: YES
GFR SERPLBLD BASED ON 1.73 SQ M-ARVRAT: 67 ML/MIN/1.73M2 (ref 60–?)
GLOBULIN PLAS-MCNC: 3.7 G/DL (ref 2.8–4.4)
GLUCOSE BLD-MCNC: 104 MG/DL (ref 70–99)
HDLC SERPL-MCNC: 47 MG/DL (ref 40–59)
LDLC SERPL CALC-MCNC: 164 MG/DL (ref ?–100)
NONHDLC SERPL-MCNC: 186 MG/DL (ref ?–130)
OSMOLALITY SERPL CALC.SUM OF ELEC: 285 MOSM/KG (ref 275–295)
POTASSIUM SERPL-SCNC: 4 MMOL/L (ref 3.5–5.1)
PROT SERPL-MCNC: 7.9 G/DL (ref 6.4–8.2)
SODIUM SERPL-SCNC: 137 MMOL/L (ref 136–145)
TRIGL SERPL-MCNC: 121 MG/DL (ref 30–149)
TSI SER-ACNC: 2.41 MIU/ML (ref 0.36–3.74)
VLDLC SERPL CALC-MCNC: 24 MG/DL (ref 0–30)

## 2022-10-14 PROCEDURE — 3008F BODY MASS INDEX DOCD: CPT | Performed by: FAMILY MEDICINE

## 2022-10-14 PROCEDURE — 36415 COLL VENOUS BLD VENIPUNCTURE: CPT

## 2022-10-14 PROCEDURE — 99396 PREV VISIT EST AGE 40-64: CPT | Performed by: FAMILY MEDICINE

## 2022-10-14 PROCEDURE — 84443 ASSAY THYROID STIM HORMONE: CPT

## 2022-10-14 PROCEDURE — 80053 COMPREHEN METABOLIC PANEL: CPT

## 2022-10-14 PROCEDURE — 84403 ASSAY OF TOTAL TESTOSTERONE: CPT

## 2022-10-14 PROCEDURE — 3078F DIAST BP <80 MM HG: CPT | Performed by: FAMILY MEDICINE

## 2022-10-14 PROCEDURE — 3074F SYST BP LT 130 MM HG: CPT | Performed by: FAMILY MEDICINE

## 2022-10-14 PROCEDURE — 85025 COMPLETE CBC W/AUTO DIFF WBC: CPT

## 2022-10-14 PROCEDURE — 80061 LIPID PANEL: CPT

## 2022-10-14 PROCEDURE — 84402 ASSAY OF FREE TESTOSTERONE: CPT

## 2022-10-15 ENCOUNTER — LAB ENCOUNTER (OUTPATIENT)
Dept: LAB | Facility: HOSPITAL | Age: 47
End: 2022-10-15
Attending: FAMILY MEDICINE
Payer: COMMERCIAL

## 2022-10-15 DIAGNOSIS — Z00.00 ANNUAL PHYSICAL EXAM: ICD-10-CM

## 2022-10-15 LAB
BASOPHILS # BLD AUTO: 0.04 X10(3) UL (ref 0–0.2)
BASOPHILS NFR BLD AUTO: 1 %
EOSINOPHIL # BLD AUTO: 0.12 X10(3) UL (ref 0–0.7)
EOSINOPHIL NFR BLD AUTO: 3 %
ERYTHROCYTE [DISTWIDTH] IN BLOOD BY AUTOMATED COUNT: 12.4 %
HCT VFR BLD AUTO: 45 %
HGB BLD-MCNC: 15.3 G/DL
IMM GRANULOCYTES # BLD AUTO: 0 X10(3) UL (ref 0–1)
IMM GRANULOCYTES NFR BLD: 0 %
LYMPHOCYTES # BLD AUTO: 1.23 X10(3) UL (ref 1–4)
LYMPHOCYTES NFR BLD AUTO: 30.7 %
MCH RBC QN AUTO: 30.4 PG (ref 26–34)
MCHC RBC AUTO-ENTMCNC: 34 G/DL (ref 31–37)
MCV RBC AUTO: 89.5 FL
MONOCYTES # BLD AUTO: 0.32 X10(3) UL (ref 0.1–1)
MONOCYTES NFR BLD AUTO: 8 %
NEUTROPHILS # BLD AUTO: 2.3 X10 (3) UL (ref 1.5–7.7)
NEUTROPHILS # BLD AUTO: 2.3 X10(3) UL (ref 1.5–7.7)
NEUTROPHILS NFR BLD AUTO: 57.3 %
PLATELET # BLD AUTO: 165 10(3)UL (ref 150–450)
RBC # BLD AUTO: 5.03 X10(6)UL
WBC # BLD AUTO: 4 X10(3) UL (ref 4–11)

## 2022-10-15 PROCEDURE — 36415 COLL VENOUS BLD VENIPUNCTURE: CPT

## 2022-10-15 PROCEDURE — 85025 COMPLETE CBC W/AUTO DIFF WBC: CPT

## 2022-10-17 ENCOUNTER — PATIENT MESSAGE (OUTPATIENT)
Dept: FAMILY MEDICINE CLINIC | Facility: CLINIC | Age: 47
End: 2022-10-17

## 2022-10-18 RX ORDER — ESZOPICLONE 1 MG/1
1 TABLET, FILM COATED ORAL NIGHTLY
Qty: 30 TABLET | Refills: 1 | Status: SHIPPED | OUTPATIENT
Start: 2022-10-18

## 2022-10-18 NOTE — TELEPHONE ENCOUNTER
From: Serg Figueroa  To: Savannah Real MD  Sent: 10/17/2022 9:03 PM CDT  Subject: Follow Up    Thank you for your message on labs and diet. It doesn't look like testosterone was completed? Also, I haven't received a message from [facility] about refill on Lunesta, if you can please process when convenient. Jasonala.

## 2022-10-27 LAB
TESTOSTERONE, FREE, S: 9.5 NG/DL
TESTOSTERONE, TOTAL, S: 392 NG/DL

## 2023-07-13 ENCOUNTER — PATIENT MESSAGE (OUTPATIENT)
Dept: URGENT CARE | Age: 48
End: 2023-07-13

## 2023-07-13 DIAGNOSIS — R94.31 ABNORMAL EKG: Primary | ICD-10-CM

## 2023-07-14 ENCOUNTER — OFFICE VISIT (OUTPATIENT)
Dept: FAMILY MEDICINE CLINIC | Facility: CLINIC | Age: 48
End: 2023-07-14

## 2023-07-14 VITALS
BODY MASS INDEX: 25.74 KG/M2 | DIASTOLIC BLOOD PRESSURE: 70 MMHG | HEIGHT: 70 IN | WEIGHT: 179.81 LBS | RESPIRATION RATE: 18 BRPM | SYSTOLIC BLOOD PRESSURE: 110 MMHG | OXYGEN SATURATION: 98 % | HEART RATE: 74 BPM

## 2023-07-14 DIAGNOSIS — R07.9 CHEST PAIN, UNSPECIFIED TYPE: Primary | ICD-10-CM

## 2023-07-14 PROCEDURE — 99215 OFFICE O/P EST HI 40 MIN: CPT | Performed by: FAMILY MEDICINE

## 2023-07-14 PROCEDURE — 3078F DIAST BP <80 MM HG: CPT | Performed by: FAMILY MEDICINE

## 2023-07-14 PROCEDURE — 3074F SYST BP LT 130 MM HG: CPT | Performed by: FAMILY MEDICINE

## 2023-07-14 PROCEDURE — 3008F BODY MASS INDEX DOCD: CPT | Performed by: FAMILY MEDICINE

## 2023-07-14 RX ORDER — ESZOPICLONE 1 MG/1
1 TABLET, FILM COATED ORAL NIGHTLY
Qty: 30 TABLET | Refills: 1 | Status: SHIPPED | OUTPATIENT
Start: 2023-07-14

## 2023-07-14 RX ORDER — ALPRAZOLAM 0.5 MG/1
0.5 TABLET ORAL NIGHTLY PRN
Qty: 40 TABLET | Refills: 1 | Status: SHIPPED | OUTPATIENT
Start: 2023-07-14

## 2023-07-14 RX ORDER — PROPRANOLOL HYDROCHLORIDE 10 MG/1
10 TABLET ORAL 3 TIMES DAILY PRN
Qty: 50 TABLET | Refills: 0 | Status: SHIPPED | OUTPATIENT
Start: 2023-07-14

## 2023-07-14 NOTE — PROGRESS NOTES
Subjective:   Patient ID: Carlos Galvez is a 57year old male. HPI  For last year or so has pain that starts in the right side abdomen then travels to the left side of his back   Also has chest pain on and off   Yesterday this abdominal pain lasted for 3 days without going away   No nausea no vomiting no fever was er ct inconclusive possitble early appendicitis, possible colitis found out all blood test were normal EKG showed some changes though   Was given antibiotic and sent home   He is doing better today   History/Other:   Review of Systems  Constitutional: Negative. Negative for activity change, appetite change, diaphoresis and fatigue. Respiratory: Negative. Negative for apnea, cough, chest tightness and shortness of breath. Cardiovascular: see hpi   Gastrointestinal: see hpi   Skin: Negative. Psychiatric/Behavioral: needs refill on some meds          Current Outpatient Medications   Medication Sig Dispense Refill    eszopiclone (LUNESTA) 1 MG Oral Tab Take 1 tablet (1 mg total) by mouth nightly. 30 tablet 1    ALPRAZolam (XANAX) 0.5 MG Oral Tab Take 1 tablet (0.5 mg total) by mouth nightly as needed. 40 tablet 1    propranolol 10 MG Oral Tab Take 1 tablet (10 mg total) by mouth 3 (three) times daily as needed. 50 tablet 0    methocarbamol (ROBAXIN) 500 MG Oral Tab Take 1 tablet (500 mg total) by mouth 4 (four) times daily. 30 tablet 0     Allergies:No Known Allergies    Objective:   Physical Exam  Constitutional:       Appearance: He is well-developed. Cardiovascular:      Rate and Rhythm: Normal rate and regular rhythm. Heart sounds: Normal heart sounds. Pulmonary:      Effort: Pulmonary effort is normal.      Breath sounds: Normal breath sounds. Abdominal:      General: Bowel sounds are normal.      Palpations: Abdomen is soft. Skin:     General: Skin is warm and dry. Neurological:      Mental Status: He is alert.       Deep Tendon Reflexes: Reflexes are normal and symmetric. Assessment & Plan:   Chest pain, unspecified type  (primary encounter diagnosis)  Needs to do stress echo due to some EKG changes   Abdominal pain -better cpm   Also has to see a surgeon for f/u   If any worsening to go to er  No orders of the defined types were placed in this encounter. Meds This Visit:  Requested Prescriptions     Signed Prescriptions Disp Refills    eszopiclone (LUNESTA) 1 MG Oral Tab 30 tablet 1     Sig: Take 1 tablet (1 mg total) by mouth nightly. ALPRAZolam (XANAX) 0.5 MG Oral Tab 40 tablet 1     Sig: Take 1 tablet (0.5 mg total) by mouth nightly as needed. propranolol 10 MG Oral Tab 50 tablet 0     Sig: Take 1 tablet (10 mg total) by mouth 3 (three) times daily as needed.        Imaging & Referrals:  CARD ECHO STRESS ECHO/REST AND STRESS(CPT=93350/07806 DMG [id number])

## 2023-07-21 ENCOUNTER — TELEPHONE (OUTPATIENT)
Dept: FAMILY MEDICINE CLINIC | Facility: CLINIC | Age: 48
End: 2023-07-21

## 2023-07-21 NOTE — TELEPHONE ENCOUNTER
Patient contacts clinic regarding testing that is ordered for him. He would like to discuss US vs CT of abdomen. He would like to speak with the doctor directly about this. Does not want to provide the RN with any information as he does not feel it is efficient.

## 2023-07-25 ENCOUNTER — PATIENT MESSAGE (OUTPATIENT)
Dept: FAMILY MEDICINE CLINIC | Facility: CLINIC | Age: 48
End: 2023-07-25

## 2023-07-28 ENCOUNTER — PATIENT MESSAGE (OUTPATIENT)
Dept: CASE MANAGEMENT | Age: 48
End: 2023-07-28

## 2023-07-28 ENCOUNTER — TELEPHONE (OUTPATIENT)
Dept: CASE MANAGEMENT | Age: 48
End: 2023-07-28

## 2023-07-28 NOTE — TELEPHONE ENCOUNTER
Patient is calling back to advise he spoke to [facility] and was advised he was denied because of low risk. Patient spoke to Authorization at [facility] and was advised to contact PCP for peer to peer approval.  Patient mentions a document was sent to PCP for completion and because the form was not submitted back to insurance for review, it was denied. Patient has the appointment scheduled on 7/31/2023 and mentions he would like to keep this appointment as symptoms continue. Patient mentions he sent a BackTrack message to provider on 7/28/23. Patient is requesting a call back. Please advise.

## 2023-07-28 NOTE — TELEPHONE ENCOUNTER
Denial: Note:  Status Of Case is DENIED. Attempted to reach out to patient by phone and/or Mychart. Insurance will not cover without approval.  PATIENT CANNOT PROCEED. Please have patient reach out to provider for next steps. Please advise on what patient should do ?

## 2023-07-28 NOTE — TELEPHONE ENCOUNTER
CARD ECHO STRESS ECHO/REST AND STRESS         Status: DENIED        Reference number [id number]     A copy of the denial letter is filed under the MEDIA tab, reference for complete details. You may reach out to [address] [place] [address] at [phone] to discuss decision. Please reach out to patient with plan of care.       Thank you

## 2023-08-03 ENCOUNTER — MED REC SCAN ONLY (OUTPATIENT)
Dept: FAMILY MEDICINE CLINIC | Facility: CLINIC | Age: 48
End: 2023-08-03

## 2023-08-15 NOTE — TELEPHONE ENCOUNTER
I would like to do peer to peer for stress test as I believe that he needs one   Can you assist me with scheduling one if it is not too late

## 2023-08-24 NOTE — TELEPHONE ENCOUNTER
Dr Catalina monae, please review    Patient is asking for an update? Jerzy ESTES Em Triage Support (supporting GILSON Pena)20 hours ago (4:42 PM)     DZ  Following up to see if [id number] was approved for the stress echo. Gallbladder test was negative and had 70% ejection. No pain, or very minimal while in [place]. All pains returned within ~3 days of coming back home. Thank you.

## 2023-08-24 NOTE — TELEPHONE ENCOUNTER
See also PA 7/28/23 encounter . OFFICE STAFF=please assists. See Dr Bethanie Aldrich note below. RN sent direct message and contacted Dr Hugo Emmanuel regarding the encounter . COPIED AND PASTE REFERRAL TEAM SPECIALIST\"S NOTE from  encounter 7/28/23 . August 21, 2023       EG    8/21/23  2:09 PM  Christian Moise routed this conversation to Em [address]  Marielos Zimmer         8/21/23  2:06 PM  Note  To complete a peer to peer please call [address] [facility] [phone]. Case #[id number]. The denial rationale is located under the Media tab, under Referral Attachment. Thank you. August 15, 2023  Cira Rodriguez MD   to [address]    8/15/23 10:33 AM   Please help with scheduling peer to peer for this case-denied stress echo     Enis Simmonds, [address]     DY    8/15/23 10:29 AM  Note  Triage support does not manage prior auth for testing.  Please contact [facility] for this        August 4, 2023  Austin Stern   to Cira Rodriguez MD   [facility]    8/4/23  2:29 PM   Need paperwork and number to call and schedule      Theron LILIAN Rich   to Cira Rodriguez MD  [facility]   IC    [birth year] 10:06 AM   Please advise on Peer-peer, [facility] care cannot complete a peer-peer

## 2023-08-24 NOTE — TELEPHONE ENCOUNTER
I send a request that somebody helps me set up a peer to peer review for this patient stress echo   I am available any day but Tuesday after 3 pm

## 2023-11-27 ENCOUNTER — TELEPHONE (OUTPATIENT)
Dept: FAMILY MEDICINE CLINIC | Facility: CLINIC | Age: 48
End: 2023-11-27

## 2023-11-27 NOTE — TELEPHONE ENCOUNTER
Patient called back. Informed of message below. Patient verbalized understanding. Video visit made 11/30/23 to discuss medications needed.

## 2023-11-27 NOTE — TELEPHONE ENCOUNTER
Patient called requesting refill on medication below, patient is also asking that if possible to change XANAX to Colonazepam.     Medication Quantity Refills Start End   eszopiclone (LUNESTA) 1 MG Oral Tab         Medication Quantity Refills Start End   propranolol 10 MG Oral Tab         Medication Quantity Refills Start End   methocarbamol (ROBAXIN) 500 MG Oral Tab

## 2023-11-27 NOTE — TELEPHONE ENCOUNTER
Patient needs visit to discuss med change request. Also need more information about request for robaxin, not prescribed since 6/2022. Left message for patient to call back and discuss. Mychart message sent as well.

## 2023-12-01 NOTE — TELEPHONE ENCOUNTER
Please review; protocol failed. Requested Prescriptions   Pending Prescriptions Disp Refills    propranolol 10 MG Oral Tab 50 tablet 0     Sig: Take 1 tablet (10 mg total) by mouth 3 (three) times daily as needed. Hypertensive Medications Protocol Failed - 11/30/2023  8:47 PM        Failed - CMP or BMP in past 6 months     No results found for this or any previous visit (from the past 4392 hour(s)).             Failed - EGFRCR or GFRNAA > 50     GFR Evaluation            Passed - In person appointment in the past 12 or next 3 months     Recent Outpatient Visits              Arpita Perez MD    Telemedicine    4 months ago Chest pain, unspecified type    Viktoriya Gómez MD    Office Visit    1 year ago Abdominal pain, unspecified abdominal location    Filomena Martinez MD    Office Visit    2 years ago Annual physical exam    Viktoriya Gómez MD    Office Visit    3 years ago Other neutropenia [facility])    [facility] Brian Dockery MD    Office Visit                      Passed - Last BP reading less than 140/90     BP Readings from Last 1 Encounters:   07/14/23 110/70               Passed - In person appointment or virtual visit in the past 6 months     Recent Outpatient Visits              Yesterday Priyanka , [place]    Telemedicine    4 months ago Chest pain, unspecified type    Viktoriya Gómez MD    Office Visit    1 year ago Abdominal pain, unspecified abdominal location    Viktoriya Gómez MD    Office Visit    2 years ago Annual physical exam    [facility] Ayesha Rankin MD    Office Visit    3 years ago Other neutropenia [facility])    [facility] Hematology Oncology Luh Salinas MD    Office Visit                               Recent Outpatient Visits              Yesterday [address] [place] [address], [address] [place] [address] Gayle Jack MD    Telemedicine    4 months ago Chest pain, unspecified type    Dylan Lindsey MD    Office Visit    1 year ago Abdominal pain, unspecified abdominal location    Dylan Lindsey MD    Office Visit    2 years ago Annual physical exam    Dylan Lindsey MD    Office Visit    3 years ago Other neutropenia [facility])    [facility] Hematology Oncology Luh Salinas MD    Office Visit

## 2023-12-05 RX ORDER — PROPRANOLOL HYDROCHLORIDE 10 MG/1
10 TABLET ORAL 3 TIMES DAILY PRN
Qty: 50 TABLET | Refills: 0 | Status: SHIPPED | OUTPATIENT
Start: 2023-12-05

## 2023-12-21 ENCOUNTER — MED REC SCAN ONLY (OUTPATIENT)
Dept: FAMILY MEDICINE CLINIC | Facility: CLINIC | Age: 48
End: 2023-12-21

## 2024-06-05 DIAGNOSIS — G47.00 INSOMNIA, UNSPECIFIED TYPE: Primary | ICD-10-CM

## 2024-06-10 NOTE — TELEPHONE ENCOUNTER
Please review. Protocol Failed; No Protocol      Recent fills: 12/28/2023, 1/27/2024  Last Rx written: 11/30/2023  Last office visit: 7/14/2023  Televisit: 11/30/2023    Recent Visits  Date Type Provider Dept   07/14/23 Office Visit Mily Solis MD [facility]   Showing recent visits within past 540 days with a meds authorizing provider and meeting all other requirements  Future Appointments  No visits were found meeting these conditions.  Showing future appointments within next 150 days with a meds authorizing provider and meeting all other requirements        Requested Prescriptions   Pending Prescriptions Disp Refills    eszopiclone 1 MG Oral Tab 30 tablet 2     Sig: Take 1 tablet (1 mg total) by mouth nightly.       Controlled Substance Medication Failed - 6/5/2024  8:26 AM        Failed - This medication is a controlled substance - forward to provider to refill                 Recent Outpatient Visits              6 months ago [facility]    [facility]icine    11 months ago Chest pain, unspecified type    [facility]    Office Visit    1 year ago Abdominal pain, unspecified abdominal location    [facility]    Office Visit    2 years ago Annual physical exam    [facility]    Office Visit    4 years ago Other neutropenia (HCC)    [facility] Hematology Oncology Ni Walter MD    Office Visit

## 2024-06-10 NOTE — TELEPHONE ENCOUNTER
Please review. Rx failed/no protocol.    Requested Prescriptions   Pending Prescriptions Disp Refills    propranolol 10 MG Oral Tab 50 tablet 0     Sig: Take 1 tablet (10 mg total) by mouth 3 (three) times daily as needed.       Hypertension Medications Protocol Failed - 6/5/2024  8:26 AM        Failed - CMP or BMP in past 12 months        Failed - EGFRCR or GFRNAA > 50     GFR Evaluation            Passed - Last BP reading less than 140/90     BP Readings from Last 1 Encounters:   07/14/23 110/70               Passed - In person appointment or virtual visit in the past 12 mos or appointment in next 3 mos     Recent Outpatient Visits              6 months ago [facility]    [facility]    11 months ago Chest pain, unspecified type    [facility]    Office Visit    1 year ago Abdominal pain, unspecified abdominal location    [facility]    Office Visit    2 years ago Annual physical exam    [facility]    Office Visit    4 years ago Other neutropenia (HCC)    [facility] Hematology On[facility] Ni Walter MD    Office Visit                             Recent Outpatient Visits              6 months ago Anxiety    [facility]    [facility]    11 months ago Chest pain, unspecified type    [facility]    Office Visit    1 year ago Abdominal pain, unspecified abdominal location    [facility]    Office Visit    2 years ago Annual physical exam    [facility]    Office Visit    4 years ago Other neutropenia  (HCC)    [facility] Hematology Oncology Ni Walter MD    Office Visit

## 2024-06-11 RX ORDER — ESZOPICLONE 1 MG/1
1 TABLET, FILM COATED ORAL NIGHTLY
Qty: 30 TABLET | Refills: 2 | Status: SHIPPED | OUTPATIENT
Start: 2024-06-11

## 2024-06-11 RX ORDER — PROPRANOLOL HYDROCHLORIDE 10 MG/1
10 TABLET ORAL 3 TIMES DAILY PRN
Qty: 50 TABLET | Refills: 0 | Status: SHIPPED | OUTPATIENT
Start: 2024-06-11